# Patient Record
Sex: FEMALE | Race: BLACK OR AFRICAN AMERICAN
[De-identification: names, ages, dates, MRNs, and addresses within clinical notes are randomized per-mention and may not be internally consistent; named-entity substitution may affect disease eponyms.]

---

## 2018-11-29 ENCOUNTER — HOSPITAL ENCOUNTER (OUTPATIENT)
Dept: HOSPITAL 62 - END | Age: 63
Discharge: HOME | End: 2018-11-29
Attending: SURGERY
Payer: MEDICARE

## 2018-11-29 VITALS — SYSTOLIC BLOOD PRESSURE: 126 MMHG | DIASTOLIC BLOOD PRESSURE: 52 MMHG

## 2018-11-29 DIAGNOSIS — I10: ICD-10-CM

## 2018-11-29 DIAGNOSIS — I83.90: ICD-10-CM

## 2018-11-29 DIAGNOSIS — Z80.0: ICD-10-CM

## 2018-11-29 DIAGNOSIS — Z79.899: ICD-10-CM

## 2018-11-29 DIAGNOSIS — D12.5: ICD-10-CM

## 2018-11-29 DIAGNOSIS — K64.8: ICD-10-CM

## 2018-11-29 DIAGNOSIS — Z12.11: Primary | ICD-10-CM

## 2018-11-29 DIAGNOSIS — Z88.6: ICD-10-CM

## 2018-11-29 DIAGNOSIS — Z88.0: ICD-10-CM

## 2018-11-29 PROCEDURE — 88305 TISSUE EXAM BY PATHOLOGIST: CPT

## 2018-11-29 PROCEDURE — 45380 COLONOSCOPY AND BIOPSY: CPT

## 2018-11-29 PROCEDURE — 0DBN8ZX EXCISION OF SIGMOID COLON, VIA NATURAL OR ARTIFICIAL OPENING ENDOSCOPIC, DIAGNOSTIC: ICD-10-PCS | Performed by: SURGERY

## 2018-11-29 RX ADMIN — FENTANYL CITRATE ONE MCG: 50 INJECTION INTRAMUSCULAR; INTRAVENOUS at 08:09

## 2018-11-29 RX ADMIN — FENTANYL CITRATE ONE MCG: 50 INJECTION INTRAMUSCULAR; INTRAVENOUS at 08:05

## 2018-11-29 RX ADMIN — MIDAZOLAM HYDROCHLORIDE ONE MG: 1 INJECTION, SOLUTION INTRAMUSCULAR; INTRAVENOUS at 08:07

## 2018-11-29 RX ADMIN — MIDAZOLAM HYDROCHLORIDE ONE MG: 1 INJECTION, SOLUTION INTRAMUSCULAR; INTRAVENOUS at 08:11

## 2018-11-29 RX ADMIN — FENTANYL CITRATE ONE MCG: 50 INJECTION INTRAMUSCULAR; INTRAVENOUS at 08:13

## 2018-11-29 RX ADMIN — MIDAZOLAM HYDROCHLORIDE ONE MG: 1 INJECTION, SOLUTION INTRAMUSCULAR; INTRAVENOUS at 08:03

## 2018-11-29 RX ADMIN — FENTANYL CITRATE ONE MCG: 50 INJECTION INTRAMUSCULAR; INTRAVENOUS at 08:15

## 2018-11-29 NOTE — OPERATIVE REPORT
Operative Report


DATE OF SURGERY: 11/29/18


PREOPERATIVE DIAGNOSIS: 1.  Screen for colon carcinoma.  2.  Strong family 

history of colon cancer


POSTOPERATIVE DIAGNOSIS: Same with.  1.  Internal hemorrhoids.  2.  Sigmoid 

colon polyp


OPERATION: 1.  Total colonoscopy to cecum with photodocumentation.  2.  Sigmoid 

colon polypectomy with cold forceps device


SURGEON: LITO SINGH


ANESTHESIA: Moderate Sedation


TISSUE REMOVED OR ALTERED: Polyp


COMPLICATIONS: 





None


ESTIMATED BLOOD LOSS: None


INTRAOPERATIVE FINDINGS: See below


PROCEDURE: 





Obtaining informed consent the patient was taken from the preoperative holding 

area to the main endoscopy suite where monitoring devices were attached to  the 

patient.  Plan and surgical timeout were conducted


 


The patient was placed in the left lateral decubitus  position with knees to 

chest.  A perianal examination was performed.  There was no visible or palpable 

anorectal pathology.  Sphincter tone was felt to be normal.


 


The flexible adult colonoscope was advanced through the anal rectal canal, all 

the way to the cecum.  Visualization  of the cecum was achieved and the 

ileocecal valve, the appendiceal orifice and transillumination of the anterior 

abdominal wall.  This was an excellent study on the well-prepped bowel.  The 

colonoscope was withdrawn slowly and methodically checked and the mucosa 

carefully.  There was no evidence of tumor, stricture, bleeding;





In the sigmoid colon approximately 20 cm from the anal verge was a flat less 

than 1 cm slightly erythematous well-circumscribed solid polyp, photographed 

and removed with a single bite of the cold forceps device.  Specimen retrieved 

and labeled as sigmoid colon polyp and sent for pathologic analysis.  Bleeding 

was minimal.  There was no evidence of diverticuloses.  The scope was slowly 

withdrawn through the anal rectal canal.  Complete visualization of the rectum 

was achieved with photodocumentation.


 


The scope was withdrawn to the patient's anus.  Small internal hemorrhoids were 

appreciated by retroflexing the scope.  The patient tolerated the procedure 

well and was taken to the recovery area in stable condition.





Per surveillance guidelines, patient be appropriate candidate for follow-up 

colonoscopy in 3 years pending results of final pathology report.

## 2018-11-29 NOTE — DISCHARGE SUMMARY
Discharge Summary (SDC)





- Discharge


Final Diagnosis: 





Colon polyp status post total colonoscopy to cecum


Date of Surgery: 11/29/18


Discharge Date: 11/29/18


Condition: Good


Treatment or Instructions: 


               





                  [facility] [address] [address]


 Phone: [phone]    Fax:  [phone]








            


            POST ENDOSCOPY DISCHARGE INSTRUCTIONS








1.  Diet:  Start clear liquids that a regular diet as tolerated.





2.  Resume all preoperative medications.  All oral anticoagulants and aspirins 

can be resumed 24 hours after procedure.





3.  If a polypectomy was performed some bleeding per rectum may occur.  This 

should stop within 3 days.  If not, please contact the office.





4.  If you had a colonoscopy you may experience some bloating and delayed 

return of normal bowel function for several days, your regular bowel movement 

pattern should resume within a week.





5.  Please contact [facility] at [phone] to make an 

appointment with Dr. Vyas for 1 to 3 weeks following procedure.





6.  If you have any questions or concerns regarding your care,treatment plan or 

follow up, please contact our office.





7.  Per clinical guidelines we recommend you undergo a repeat colonoscopy in 

three years.


Discharge Diet: As Tolerated


Discharge Activity: Activity As Tolerated


Home Care Assistance: None Needed


Report the Following to Your Physician Immediately: Shortness of Breath, 

Increase in Pain, Fever over 101 Degrees

## 2019-03-22 ENCOUNTER — HOSPITAL ENCOUNTER (EMERGENCY)
Dept: HOSPITAL 62 - ER | Age: 64
LOS: 1 days | Discharge: ANOTHER HOSPITAL | End: 2019-03-23
Payer: MEDICARE

## 2019-03-22 DIAGNOSIS — I10: ICD-10-CM

## 2019-03-22 DIAGNOSIS — I95.2: ICD-10-CM

## 2019-03-22 DIAGNOSIS — I46.9: Primary | ICD-10-CM

## 2019-03-22 DIAGNOSIS — I47.2: ICD-10-CM

## 2019-03-22 DIAGNOSIS — T40.4X5A: ICD-10-CM

## 2019-03-22 DIAGNOSIS — D72.829: ICD-10-CM

## 2019-03-22 DIAGNOSIS — Z88.0: ICD-10-CM

## 2019-03-22 DIAGNOSIS — R06.82: ICD-10-CM

## 2019-03-22 DIAGNOSIS — J81.1: ICD-10-CM

## 2019-03-22 LAB
ABSOLUTE LYMPHOCYTES# (MANUAL): 9.3 10^3/UL (ref 0.5–4.7)
ABSOLUTE MONOCYTES # (MANUAL): 1.1 10^3/UL (ref 0.1–1.4)
ABSOLUTE NEUTROPHILS# (MANUAL): 25 10^3/UL (ref 1.7–8.2)
ADD MANUAL DIFF: YES
ALBUMIN SERPL-MCNC: 3.1 G/DL (ref 3.5–5)
ALP SERPL-CCNC: 205 U/L (ref 38–126)
ALT SERPL-CCNC: 307 U/L (ref 9–52)
ANION GAP SERPL CALC-SCNC: 16 MMOL/L (ref 5–19)
AST SERPL-CCNC: 334 U/L (ref 14–36)
BASOPHILS NFR BLD MANUAL: 0 % (ref 0–2)
BILIRUB DIRECT SERPL-MCNC: 0.2 MG/DL (ref 0–0.4)
BILIRUB SERPL-MCNC: 0.3 MG/DL (ref 0.2–1.3)
BUN SERPL-MCNC: 14 MG/DL (ref 7–20)
BURR CELLS BLD QL SMEAR: (no result)
CALCIUM: 8.3 MG/DL (ref 8.4–10.2)
CHLORIDE SERPL-SCNC: 106 MMOL/L (ref 98–107)
CK MB SERPL-MCNC: 3.24 NG/ML (ref ?–4.55)
CK SERPL-CCNC: 283 U/L (ref 30–135)
CO2 SERPL-SCNC: 16 MMOL/L (ref 22–30)
EOSINOPHIL NFR BLD MANUAL: 1 % (ref 0–6)
ERYTHROCYTE [DISTWIDTH] IN BLOOD BY AUTOMATED COUNT: 13.5 % (ref 11.5–14)
GLUCOSE SERPL-MCNC: 261 MG/DL (ref 75–110)
HCT VFR BLD CALC: 47.6 % (ref 36–47)
HGB BLD-MCNC: 15.1 G/DL (ref 12–15.5)
MCH RBC QN AUTO: 27.7 PG (ref 27–33.4)
MCHC RBC AUTO-ENTMCNC: 31.7 G/DL (ref 32–36)
MCV RBC AUTO: 87 FL (ref 80–97)
METAMYELOCYTES % (MANUAL): 3 %
MONOCYTES % (MANUAL): 3 % (ref 3–13)
MYELOCYTES % (MANUAL): 2 %
NEUTS BAND NFR BLD MANUAL: 11 % (ref 3–5)
NT PRO BNP: 584 PG/ML (ref 5–900)
PLATELET # BLD: 370 10^3/UL (ref 150–450)
PLATELET COMMENT: ADEQUATE
PLATELET GIANT: PRESENT
POIKILOCYTOSIS BLD QL SMEAR: SLIGHT
POTASSIUM SERPL-SCNC: 4.1 MMOL/L (ref 3.6–5)
PROT SERPL-MCNC: 6.8 G/DL (ref 6.3–8.2)
RBC # BLD AUTO: 5.45 10^6/UL (ref 3.72–5.28)
SEGMENTED NEUTROPHILS % (MAN): 54 % (ref 42–78)
SODIUM SERPL-SCNC: 138.2 MMOL/L (ref 137–145)
TOTAL CELLS COUNTED BLD: 100
TROPONIN I SERPL-MCNC: 0.09 NG/ML
VARIANT LYMPHS NFR BLD MANUAL: 26 % (ref 13–45)
WBC # BLD AUTO: 35.7 10^3/UL (ref 4–10.5)

## 2019-03-22 PROCEDURE — 99291 CRITICAL CARE FIRST HOUR: CPT

## 2019-03-22 PROCEDURE — 82550 ASSAY OF CK (CPK): CPT

## 2019-03-22 PROCEDURE — C1751 CATH, INF, PER/CENT/MIDLINE: HCPCS

## 2019-03-22 PROCEDURE — 96374 THER/PROPH/DIAG INJ IV PUSH: CPT

## 2019-03-22 PROCEDURE — 93010 ELECTROCARDIOGRAM REPORT: CPT

## 2019-03-22 PROCEDURE — 51702 INSERT TEMP BLADDER CATH: CPT

## 2019-03-22 PROCEDURE — 80053 COMPREHEN METABOLIC PANEL: CPT

## 2019-03-22 PROCEDURE — 94002 VENT MGMT INPAT INIT DAY: CPT

## 2019-03-22 PROCEDURE — 99292 CRITICAL CARE ADDL 30 MIN: CPT

## 2019-03-22 PROCEDURE — 93005 ELECTROCARDIOGRAM TRACING: CPT

## 2019-03-22 PROCEDURE — 94660 CPAP INITIATION&MGMT: CPT

## 2019-03-22 PROCEDURE — 36415 COLL VENOUS BLD VENIPUNCTURE: CPT

## 2019-03-22 PROCEDURE — 85025 COMPLETE CBC W/AUTO DIFF WBC: CPT

## 2019-03-22 PROCEDURE — 82553 CREATINE MB FRACTION: CPT

## 2019-03-22 PROCEDURE — 84484 ASSAY OF TROPONIN QUANT: CPT

## 2019-03-22 PROCEDURE — 83880 ASSAY OF NATRIURETIC PEPTIDE: CPT

## 2019-03-22 PROCEDURE — 71045 X-RAY EXAM CHEST 1 VIEW: CPT

## 2019-03-22 NOTE — ER DOCUMENT REPORT
ED General





- General


Stated Complaint: POST ARREST


Time Seen by Provider: 03/22/19 21:25


Cannot obtain history due to: Unstable vital signs, Altered mental status


Notes: 





Patient is a 63-year-old female who presents status post cardiac arrest.  

Apparently was visiting with a family member at [facility], stood 

up, stated her chest hurt and then dropped to the ground.  Patient was noted to 

be in cardiac arrest, bystander CPR was immediately initiated.  Patient 

apparently had a total of 15-minute code, initial rhythm PEA.  Had a period of 

ventricle tachycardia which was cardioverted with a single shock of 100 J.  

Patient was intubated in route to the hospital.  No further history can be 

obtained secondary to clinical scenario.


TRAVEL OUTSIDE OF THE [place] IN LAST 30 DAYS: No





- Related Data


Allergies/Adverse Reactions: 


                                        





Penicillins Allergy (Intermediate, Verified 11/29/18 07:19)


   AMS











Past Medical History





- General


Information source: Emergency Med Personnel


Cannot obtain history due to: Unstable vital signs, Altered mental status





- Social History


Smoking Status: Unknown if Ever Smoked


Family History: Reviewed & Not Pertinent





- Past Medical History


Cardiac Medical History: Reports: Hx Hypertension


   Denies: Hx Coronary Artery Disease, Hx Heart Attack


Pulmonary Medical History: 


   Denies: Hx Asthma, Hx Bronchitis, Hx COPD, Hx Pneumonia


Neurological Medical History: Denies: Hx Cerebrovascular Accident, Hx Seizures


Musculoskeletal Medical History: Reports Hx Arthritis - physical therapy for R. 

arm, arthritis


Past Surgical History: Reports: Hx Hysterectomy





- Immunizations


Hx Diphtheria, Pertussis, Tetanus Vaccination: Yes





Review of Systems





- Review of Systems


-: Yes ROS unobtainable due to patient's medical condition





Physical Exam





- Vital signs


Vitals: 


                                        











Pulse Ox


 


 98 


 


 03/22/19 21:10











Interpretation: Hypertensive, Tachycardic, Tachypneic


Notes: 





PHYSICAL EXAMINATION:





GENERAL: Obtunded, ET tube in place.





HEAD: Atraumatic, normocephalic.





EYES: Pupils 6 mm bilaterally, sluggishly reactive sclera anicteric, conjunctiva

are normal.





ENT: nares patent, oropharynx clear without exudates.  Moderate dry mucous 

membranes.





NECK:  supple without lymphadenopathy





LUNGS: Tachypnea, scattered rhonchi in all lung fields, diffuse B-lines on 

bedside ultrasound





HEART: Regular tachycardia, no pericardial effusion on bedside ultrasound





ABDOMEN: Soft, No masses appreciated.





EXTREMITIES: 4+ pitting edema in the bilateral lower extremities that is equal 

and symmetric.  No cyanosis.





NEUROLOGICAL: No response to noxious stimuli in any extremity.  Patient does 

have cough and gag.  Bucking at the tube.





PSYCH: Unable to assess





SKIN: Warm, Dry, normal turgor, no rashes or lesions noted.





Course





- Re-evaluation


Re-evalutation: 





03/22/19 21:27


Documentation is delayed as I been at this patient's bedside continuously since 

presentation.  In summary this patient arrives with a post cardiac arrest, 

intubated in the field.  Initial rhythm PEA, did have a run of ventricular 

tachycardia, cardioversion applied successfully.  Patient arrives hypertensive, 

tachycardic, bucking at the tube, breathing over the ventilator.  She has 

pulmonary edema pouring out of the ET tube.  Apparently the patient claims chest

pain shortness of breath and then had a witnessed arrest.  Bedside ultrasound 

shows diffuse B-lines in all lung fields consistent with pulmonary edema.  

Bedside echocardiogram without evidence of pericardial effusion or regional wall

motion of normality.  EKG with aberrant rhythm, sinus tachycardia, no distinct 

STEMI pattern.  Patient was given 40 mill grams of IV furosemide given copious 

pulmonary edema.  Vent settings placed to a PEEP of 15 as patient was quite 

difficult to provide adequate oxygenation to without such high peeps.  Will ob

tain labs, chest x-ray, Duffy catheter with temperature probe has been applied. 

Patient is in critical condition, will be reassessed at regular intervals.


03/22/19 21:42


Chest x-ray shows diffuse pulmonary edema pattern.  PEEP at 16, FiO2 weaned to 

80% to maintain saturations above 92%.  Pressure is holding at 107 on 65 with 

propofol infusion at 20.  Will continue to monitor very closely.


03/22/19 21:58


Blood pressures have down trended, currently 94/72.  Propofol backed off to 10. 

Map is currently 80, monitoring very closely if patient requires vasopressor 

infusion will start with epinephrine infusion given that this is a post arrest.


03/22/19 22:13


Patient's blood pressures have up trended 119/105.  Saturations maintaining at 

93% on current vent settings.  Labs have been drawn.  Awaiting results.


03/22/19 22:27


Patient's blood pressures have remained in a more stable range, 148/94.  

Awaiting labs.  Rectal aspirin will be administered.  Will repeat EKG.





03/22/19 22:55


Labs show marketdly leukocytosis likely stress reactant.  No infectious etiology

suspected at this point.  Troponin minimally elevated at 0.086.  BNP is not 

elevated.


03/22/19 23:08


Pressures remain acceptable ranges 142 on 102.  Propofol at 20.  I have 

discussed with the family, will transfer to [facility] for availability of 

cardiac catheterization.  At this point I suspect the patient either had an MI 

or has undiagnosed CHF and had a dysrhythmia.


03/22/19 23:39


I discussed this case with Dr. Tyler Tadeo at [facility].  

Patient is pending transfer.


03/23/19 00:35


Helicopters in route for the patient.  Patient is from clinically unchanged.  

Will continue to monitor and reassess at regular intervals.


03/23/19 01:27


Apparently when transport arrived they did administer the patient 125 mg of 

fentanyl causing a period of hypotension.  This was not ordered by me, I did not

take any part in this management.  The patient became hypotensive at this point 

as I would anticipate in this context.  It is unclear to me why fentanyl was 

administered as patient was maintaining appropriate sedation on propofol and had

reasonable pressures for over 2 hours.  The flight crew had assumed complete 

care of the patient at 0048 changing various points of management that have been

keeping the patient stable.











- Vital Signs


Vital signs: 


                                        











Temp Pulse Resp BP Pulse Ox


 


 99.0 F      29 H  120/80   95 


 


 03/23/19 00:51     03/23/19 00:51  03/23/19 00:51  03/23/19 00:51














- Laboratory


Result Diagrams: 


                                 03/22/19 22:00





                                 03/22/19 22:00


Laboratory results interpreted by me: 


                                        











  03/22/19 03/22/19





  22:00 22:00


 


WBC  35.7 H* 


 


RBC  5.45 H 


 


Hct  47.6 H 


 


MCHC  31.7 L 


 


Band Neutrophils %  11 H 


 


Metamyelocytes %  3 H 


 


Myelocytes %  2 H 


 


Abs Neuts (Manual)  25.0 H 


 


Abs Lymphs (Manual)  9.3 H 


 


Carbon Dioxide   16 L


 


Est GFR (Non-Af Amer)   53 L


 


Glucose   261 H


 


Calcium   8.3 L


 


AST   334 H


 


ALT   307 H


 


Alkaline Phosphatase   205 H


 


Creatine Kinase   283 H


 


Albumin   3.1 L














- Diagnostic Test


Radiology reviewed: Image reviewed, Reports reviewed


Radiology results interpreted by me: 





03/22/19 23:39


Chest x-ray: Diffuse pulmonary edema pattern.





- EKG Interpretation by Me


Additional EKG results interpreted by me: 





03/22/19 23:39


EKG 1: Sinus tachycardia, rate 125.  Frequent PVCs.  Diffuse nonspecific T wave 

abnormalities.  .





EKG 2: Sinus tachycardia, rate 143.  Abnormal T waves in lateral leads.





Critical Care Note





- Critical Care Note


Total time excluding time spent on procedures (mins): 85


Comments: 





Critical care time spent obtaining history from patient or surrogate, 

discussions with consultants, development of treatment plan with patient or 

surrogate, evaluation of patient's response to treatment, examination of 

patient, ordering and performing treatments and interventions, ordering and 

review of laboratory studies, re-evaluation of patient's condition, ordering and

review of radiographic studies and review of old charts





Discharge





- Discharge


Clinical Impression: 


 Cardiac arrest, Bilateral lower extremity edema, Ventricular tachycardia





Pulmonary edema


Qualifiers:


 Chronicity: acute Qualified Code(s): J81.0 - Acute pulmonary edema





Condition: Critical


Disposition: [facility]

## 2019-03-22 NOTE — RADIOLOGY REPORT (SQ)
EXAM DESCRIPTION: 



XR CHEST 1 VIEW



COMPLETED DATE/TME:  03/22/2019 21:25



CLINICAL HISTORY: 



63 years, Female, sob, post arrest



COMPARISON:

None.



NUMBER OF VIEWS:

2



TECHNIQUE:

AP views of the chest



LIMITATIONS:

None.



FINDINGS:



The heart size is normal. Extensive mixed interstitial and

airspace opacities bilaterally. Endotracheal tube with the tip

approximately 4 cm above the tamara. Enteric tube also in place.

No pneumothorax.



IMPRESSION:



Mixed interstitial and airspace opacities.



Endotracheal and enteric tubes in place

 



copyright 2011 [employer]- All Rights Reserved

## 2019-03-23 VITALS — DIASTOLIC BLOOD PRESSURE: 80 MMHG | SYSTOLIC BLOOD PRESSURE: 120 MMHG

## 2019-03-23 NOTE — EKG REPORT
SEVERITY:- ABNORMAL ECG -

SINUS TACHYCARDIA

ABERRANT COMPLEX

PROBABLE LEFT ATRIAL ABNORMALITY

BORDERLINE R WAVE PROGRESSION, ANTERIOR LEADS

ABNORMAL T, CONSIDER ISCHEMIA, LATERAL LEADS

BORDERLINE ST ELEVATION, ANTERIOR LEADS

BORDERLINE PROLONGED QT INTERVAL

:

Confirmed by: Lorelei Chao 23-Mar-2019 12:32:27

## 2019-03-23 NOTE — EKG REPORT
SEVERITY:- ABNORMAL ECG -

SINUS TACHYCARDIA

MULTIPLE VENTRICULAR PREMATURE COMPLEXES

ABNRM R PROG, CONSIDER ASMI OR LEAD PLACEMENT

NONSPECIFIC T ABNORMALITIES, LATERAL LEADS

:

Confirmed by: Lorelei Chao 23-Mar-2019 12:31:53

## 2019-03-25 LAB — PATH REV BLD -IMP: (no result)
